# Patient Record
Sex: FEMALE | Race: BLACK OR AFRICAN AMERICAN | NOT HISPANIC OR LATINO | Employment: FULL TIME | RURAL
[De-identification: names, ages, dates, MRNs, and addresses within clinical notes are randomized per-mention and may not be internally consistent; named-entity substitution may affect disease eponyms.]

---

## 2021-10-08 ENCOUNTER — HOSPITAL ENCOUNTER (EMERGENCY)
Facility: HOSPITAL | Age: 41
Discharge: HOME | End: 2021-10-08
Attending: FAMILY MEDICINE
Payer: OTHER GOVERNMENT

## 2021-10-08 VITALS
OXYGEN SATURATION: 100 % | DIASTOLIC BLOOD PRESSURE: 87 MMHG | HEIGHT: 63 IN | RESPIRATION RATE: 14 BRPM | SYSTOLIC BLOOD PRESSURE: 146 MMHG | WEIGHT: 138 LBS | BODY MASS INDEX: 24.45 KG/M2 | TEMPERATURE: 99 F | HEART RATE: 85 BPM

## 2021-10-08 DIAGNOSIS — J45.909 ACUTE ASTHMATIC BRONCHITIS: Primary | ICD-10-CM

## 2021-10-08 DIAGNOSIS — R07.9 CHEST PAIN: ICD-10-CM

## 2021-10-08 LAB
FLUAV AG UPPER RESP QL IA.RAPID: NEGATIVE
FLUBV AG UPPER RESP QL IA.RAPID: NEGATIVE
RAPID GROUP A STREP: NEGATIVE
SARS-COV+SARS-COV-2 AG RESP QL IA.RAPID: NEGATIVE

## 2021-10-08 PROCEDURE — 96372 THER/PROPH/DIAG INJ SC/IM: CPT

## 2021-10-08 PROCEDURE — 99283 PR EMERGENCY DEPT VISIT,LEVEL III: ICD-10-PCS | Mod: ,,, | Performed by: FAMILY MEDICINE

## 2021-10-08 PROCEDURE — 99283 EMERGENCY DEPT VISIT LOW MDM: CPT | Mod: ,,, | Performed by: FAMILY MEDICINE

## 2021-10-08 PROCEDURE — 87880 STREP A ASSAY W/OPTIC: CPT | Performed by: FAMILY MEDICINE

## 2021-10-08 PROCEDURE — 87428 SARSCOV & INF VIR A&B AG IA: CPT | Performed by: FAMILY MEDICINE

## 2021-10-08 PROCEDURE — 93005 ELECTROCARDIOGRAM TRACING: CPT

## 2021-10-08 PROCEDURE — 99285 EMERGENCY DEPT VISIT HI MDM: CPT | Mod: 25

## 2021-10-08 PROCEDURE — 25000242 PHARM REV CODE 250 ALT 637 W/ HCPCS: Mod: GY | Performed by: FAMILY MEDICINE

## 2021-10-08 PROCEDURE — 93010 EKG 12-LEAD: ICD-10-PCS | Mod: ,,, | Performed by: INTERNAL MEDICINE

## 2021-10-08 PROCEDURE — 93010 ELECTROCARDIOGRAM REPORT: CPT | Mod: ,,, | Performed by: INTERNAL MEDICINE

## 2021-10-08 PROCEDURE — 63600175 PHARM REV CODE 636 W HCPCS: Performed by: FAMILY MEDICINE

## 2021-10-08 RX ORDER — IPRATROPIUM BROMIDE AND ALBUTEROL SULFATE 2.5; .5 MG/3ML; MG/3ML
3 SOLUTION RESPIRATORY (INHALATION)
Status: COMPLETED | OUTPATIENT
Start: 2021-10-08 | End: 2021-10-08

## 2021-10-08 RX ORDER — PREDNISONE 20 MG/1
40 TABLET ORAL DAILY
Qty: 8 TABLET | Refills: 0 | Status: SHIPPED | OUTPATIENT
Start: 2021-10-08 | End: 2021-10-12

## 2021-10-08 RX ORDER — DEXAMETHASONE SODIUM PHOSPHATE 4 MG/ML
8 INJECTION, SOLUTION INTRA-ARTICULAR; INTRALESIONAL; INTRAMUSCULAR; INTRAVENOUS; SOFT TISSUE
Status: COMPLETED | OUTPATIENT
Start: 2021-10-08 | End: 2021-10-08

## 2021-10-08 RX ORDER — ALBUTEROL SULFATE 90 UG/1
2 AEROSOL, METERED RESPIRATORY (INHALATION) EVERY 6 HOURS PRN
Qty: 18 G | Refills: 0 | Status: SHIPPED | OUTPATIENT
Start: 2021-10-08 | End: 2022-10-08

## 2021-10-08 RX ADMIN — IPRATROPIUM BROMIDE AND ALBUTEROL SULFATE 3 ML: .5; 3 SOLUTION RESPIRATORY (INHALATION) at 12:10

## 2021-10-08 RX ADMIN — DEXAMETHASONE SODIUM PHOSPHATE 8 MG: 4 INJECTION, SOLUTION INTRAMUSCULAR; INTRAVENOUS at 12:10

## 2021-12-28 ENCOUNTER — HOSPITAL ENCOUNTER (EMERGENCY)
Facility: HOSPITAL | Age: 41
Discharge: HOME | End: 2021-12-28
Attending: FAMILY MEDICINE
Payer: OTHER GOVERNMENT

## 2021-12-28 VITALS
HEART RATE: 99 BPM | HEIGHT: 63 IN | OXYGEN SATURATION: 94 % | BODY MASS INDEX: 24.8 KG/M2 | SYSTOLIC BLOOD PRESSURE: 130 MMHG | WEIGHT: 140 LBS | RESPIRATION RATE: 28 BRPM | DIASTOLIC BLOOD PRESSURE: 90 MMHG | TEMPERATURE: 98 F

## 2021-12-28 DIAGNOSIS — R06.02 SOB (SHORTNESS OF BREATH): ICD-10-CM

## 2021-12-28 DIAGNOSIS — J44.1 COPD WITH ACUTE EXACERBATION: Primary | ICD-10-CM

## 2021-12-28 LAB
ALBUMIN SERPL BCP-MCNC: 3.2 G/DL (ref 3.5–5)
ALBUMIN/GLOB SERPL: 0.7 {RATIO}
ALP SERPL-CCNC: 100 U/L (ref 37–98)
ALT SERPL W P-5'-P-CCNC: 16 U/L (ref 13–56)
ANION GAP SERPL CALCULATED.3IONS-SCNC: 15 MMOL/L (ref 7–16)
AST SERPL W P-5'-P-CCNC: 10 U/L (ref 15–37)
BASOPHILS # BLD AUTO: 0.02 K/UL (ref 0–0.2)
BASOPHILS NFR BLD AUTO: 0.2 % (ref 0–1)
BILIRUB SERPL-MCNC: 0.5 MG/DL (ref 0–1.2)
BUN SERPL-MCNC: 4 MG/DL (ref 7–18)
BUN/CREAT SERPL: 7 (ref 6–20)
CALCIUM SERPL-MCNC: 9.1 MG/DL (ref 8.5–10.1)
CHLORIDE SERPL-SCNC: 104 MMOL/L (ref 98–107)
CO2 SERPL-SCNC: 24 MMOL/L (ref 21–32)
CREAT SERPL-MCNC: 0.57 MG/DL (ref 0.55–1.02)
DIFFERENTIAL METHOD BLD: ABNORMAL
EOSINOPHIL # BLD AUTO: 0.03 K/UL (ref 0–0.5)
EOSINOPHIL NFR BLD AUTO: 0.2 % (ref 1–4)
ERYTHROCYTE [DISTWIDTH] IN BLOOD BY AUTOMATED COUNT: 15 % (ref 11.5–14.5)
FLUAV AG UPPER RESP QL IA.RAPID: NEGATIVE
FLUBV AG UPPER RESP QL IA.RAPID: NEGATIVE
GLOBULIN SER-MCNC: 4.6 G/DL (ref 2–4)
GLUCOSE SERPL-MCNC: 108 MG/DL (ref 74–106)
HCT VFR BLD AUTO: 42.1 % (ref 38–47)
HGB BLD-MCNC: 14.2 G/DL (ref 12–16)
IMM GRANULOCYTES # BLD AUTO: 0.06 K/UL (ref 0–0.04)
IMM GRANULOCYTES NFR BLD: 0.5 % (ref 0–0.4)
LYMPHOCYTES # BLD AUTO: 1.7 K/UL (ref 1–4.8)
LYMPHOCYTES NFR BLD AUTO: 14.1 % (ref 27–41)
MCH RBC QN AUTO: 29.2 PG (ref 27–31)
MCHC RBC AUTO-ENTMCNC: 33.7 G/DL (ref 32–36)
MCV RBC AUTO: 86.6 FL (ref 80–96)
MONOCYTES # BLD AUTO: 1.12 K/UL (ref 0–0.8)
MONOCYTES NFR BLD AUTO: 9.3 % (ref 2–6)
MPC BLD CALC-MCNC: 11.5 FL (ref 9.4–12.4)
NEUTROPHILS # BLD AUTO: 9.1 K/UL (ref 1.8–7.7)
NEUTROPHILS NFR BLD AUTO: 75.7 % (ref 53–65)
NRBC # BLD AUTO: 0 X10E3/UL
NRBC, AUTO (.00): 0 %
PLATELET # BLD AUTO: 248 K/UL (ref 150–400)
POTASSIUM SERPL-SCNC: 3.8 MMOL/L (ref 3.5–5.1)
PROT SERPL-MCNC: 7.8 G/DL (ref 6.4–8.2)
RBC # BLD AUTO: 4.86 M/UL (ref 4.2–5.4)
SARS-COV+SARS-COV-2 AG RESP QL IA.RAPID: NEGATIVE
SODIUM SERPL-SCNC: 139 MMOL/L (ref 136–145)
WBC # BLD AUTO: 12.03 K/UL (ref 4.5–11)

## 2021-12-28 PROCEDURE — 87428 SARSCOV & INF VIR A&B AG IA: CPT | Performed by: FAMILY MEDICINE

## 2021-12-28 PROCEDURE — 93005 ELECTROCARDIOGRAM TRACING: CPT

## 2021-12-28 PROCEDURE — 36415 COLL VENOUS BLD VENIPUNCTURE: CPT | Performed by: FAMILY MEDICINE

## 2021-12-28 PROCEDURE — 25000242 PHARM REV CODE 250 ALT 637 W/ HCPCS: Performed by: FAMILY MEDICINE

## 2021-12-28 PROCEDURE — 99284 PR EMERGENCY DEPT VISIT,LEVEL IV: ICD-10-PCS | Mod: ,,, | Performed by: FAMILY MEDICINE

## 2021-12-28 PROCEDURE — 93010 ELECTROCARDIOGRAM REPORT: CPT | Mod: ,,, | Performed by: INTERNAL MEDICINE

## 2021-12-28 PROCEDURE — 80053 COMPREHEN METABOLIC PANEL: CPT | Performed by: FAMILY MEDICINE

## 2021-12-28 PROCEDURE — 25000003 PHARM REV CODE 250: Performed by: FAMILY MEDICINE

## 2021-12-28 PROCEDURE — 96365 THER/PROPH/DIAG IV INF INIT: CPT

## 2021-12-28 PROCEDURE — 93010 EKG 12-LEAD: ICD-10-PCS | Mod: ,,, | Performed by: INTERNAL MEDICINE

## 2021-12-28 PROCEDURE — 85025 COMPLETE CBC W/AUTO DIFF WBC: CPT | Performed by: FAMILY MEDICINE

## 2021-12-28 PROCEDURE — 99285 EMERGENCY DEPT VISIT HI MDM: CPT | Mod: 25

## 2021-12-28 PROCEDURE — 63600175 PHARM REV CODE 636 W HCPCS: Performed by: FAMILY MEDICINE

## 2021-12-28 PROCEDURE — 96375 TX/PRO/DX INJ NEW DRUG ADDON: CPT

## 2021-12-28 PROCEDURE — 99284 EMERGENCY DEPT VISIT MOD MDM: CPT | Mod: ,,, | Performed by: FAMILY MEDICINE

## 2021-12-28 RX ORDER — AZITHROMYCIN 250 MG/1
250 TABLET, FILM COATED ORAL DAILY
Qty: 6 TABLET | Refills: 0 | Status: SHIPPED | OUTPATIENT
Start: 2021-12-28

## 2021-12-28 RX ORDER — ALBUTEROL SULFATE 90 UG/1
4 AEROSOL, METERED RESPIRATORY (INHALATION) EVERY 8 HOURS
Status: DISCONTINUED | OUTPATIENT
Start: 2021-12-28 | End: 2021-12-28

## 2021-12-28 RX ORDER — ONDANSETRON 2 MG/ML
4 INJECTION INTRAMUSCULAR; INTRAVENOUS
Status: COMPLETED | OUTPATIENT
Start: 2021-12-28 | End: 2021-12-28

## 2021-12-28 RX ORDER — IPRATROPIUM BROMIDE AND ALBUTEROL SULFATE 2.5; .5 MG/3ML; MG/3ML
6 SOLUTION RESPIRATORY (INHALATION)
Status: COMPLETED | OUTPATIENT
Start: 2021-12-28 | End: 2021-12-28

## 2021-12-28 RX ORDER — ALBUTEROL SULFATE 2.5 MG/.5ML
10 SOLUTION RESPIRATORY (INHALATION) ONCE
Status: COMPLETED | OUTPATIENT
Start: 2021-12-28 | End: 2021-12-28

## 2021-12-28 RX ORDER — METHYLPREDNISOLONE SOD SUCC 125 MG
125 VIAL (EA) INJECTION
Status: COMPLETED | OUTPATIENT
Start: 2021-12-28 | End: 2021-12-28

## 2021-12-28 RX ADMIN — METHYLPREDNISOLONE SODIUM SUCCINATE 125 MG: 125 INJECTION, POWDER, FOR SOLUTION INTRAMUSCULAR; INTRAVENOUS at 11:12

## 2021-12-28 RX ADMIN — ONDANSETRON 4 MG: 2 INJECTION INTRAMUSCULAR; INTRAVENOUS at 11:12

## 2021-12-28 RX ADMIN — ALBUTEROL SULFATE 4 PUFF: 90 AEROSOL, METERED RESPIRATORY (INHALATION) at 10:12

## 2021-12-28 RX ADMIN — CEFTRIAXONE SODIUM 1 G: 1 INJECTION, POWDER, FOR SOLUTION INTRAMUSCULAR; INTRAVENOUS at 11:12

## 2021-12-28 RX ADMIN — ALBUTEROL SULFATE 10 MG: 2.5 SOLUTION RESPIRATORY (INHALATION) at 02:12

## 2021-12-28 RX ADMIN — IPRATROPIUM BROMIDE AND ALBUTEROL SULFATE 6 ML: .5; 3 SOLUTION RESPIRATORY (INHALATION) at 11:12

## 2022-03-06 ENCOUNTER — HOSPITAL ENCOUNTER (EMERGENCY)
Facility: HOSPITAL | Age: 42
Discharge: HOME | End: 2022-03-06

## 2022-03-06 VITALS
HEIGHT: 60 IN | TEMPERATURE: 98 F | SYSTOLIC BLOOD PRESSURE: 130 MMHG | OXYGEN SATURATION: 100 % | BODY MASS INDEX: 31.41 KG/M2 | WEIGHT: 160 LBS | DIASTOLIC BLOOD PRESSURE: 77 MMHG | HEART RATE: 92 BPM | RESPIRATION RATE: 16 BRPM

## 2022-03-06 DIAGNOSIS — J45.909 ASTHMA, UNSPECIFIED ASTHMA SEVERITY, UNSPECIFIED WHETHER COMPLICATED, UNSPECIFIED WHETHER PERSISTENT: Primary | ICD-10-CM

## 2022-03-06 PROCEDURE — 99283 EMERGENCY DEPT VISIT LOW MDM: CPT | Mod: ,,, | Performed by: NURSE PRACTITIONER

## 2022-03-06 PROCEDURE — 25000242 PHARM REV CODE 250 ALT 637 W/ HCPCS: Performed by: NURSE PRACTITIONER

## 2022-03-06 PROCEDURE — 99283 EMERGENCY DEPT VISIT LOW MDM: CPT

## 2022-03-06 PROCEDURE — 99283 PR EMERGENCY DEPT VISIT,LEVEL III: ICD-10-PCS | Mod: ,,, | Performed by: NURSE PRACTITIONER

## 2022-03-06 RX ORDER — IPRATROPIUM BROMIDE AND ALBUTEROL SULFATE 2.5; .5 MG/3ML; MG/3ML
3 SOLUTION RESPIRATORY (INHALATION)
Status: COMPLETED | OUTPATIENT
Start: 2022-03-06 | End: 2022-03-06

## 2022-03-06 RX ORDER — ALBUTEROL SULFATE 90 UG/1
2 AEROSOL, METERED RESPIRATORY (INHALATION) EVERY 4 HOURS PRN
Qty: 8 G | Refills: 0 | Status: SHIPPED | OUTPATIENT
Start: 2022-03-06 | End: 2023-10-12 | Stop reason: SDUPTHER

## 2022-03-06 RX ORDER — ALBUTEROL SULFATE 0.63 MG/3ML
0.63 SOLUTION RESPIRATORY (INHALATION) EVERY 6 HOURS PRN
Qty: 75 ML | Refills: 0 | Status: SHIPPED | OUTPATIENT
Start: 2022-03-06 | End: 2023-03-06

## 2022-03-06 RX ADMIN — IPRATROPIUM BROMIDE AND ALBUTEROL SULFATE 3 ML: 2.5; .5 SOLUTION RESPIRATORY (INHALATION) at 11:03

## 2022-03-06 NOTE — ED PROVIDER NOTES
Encounter Date: 3/6/2022       History     Chief Complaint   Patient presents with    Asthma    Shortness of Breath     41 year old female presents to the emergency department to be evaluated for shortness of breath and wheezing. She has a history of asthma and is a current smoker. She usually uses her albuterol inhaler for shortness of breath and wheezing as needed, but ran out yesterday. Denies any cough, fever, runny nose, chest pain, nausea, vomiting, diarrhea, loss of sense of taste or smell, known sick contacts. She requests a refill on her albuterol.    The history is provided by the patient.   Asthma  This is a chronic problem. Associated symptoms include shortness of breath. Pertinent negatives include no chest pain, no abdominal pain and no headaches.   Shortness of Breath  This is a recurrent problem. Pertinent negatives include no fever, no headaches, no coryza, no rhinorrhea, no sore throat, no swollen glands, no ear pain, no neck pain, no cough, no sputum production, no hemoptysis, no wheezing, no PND, no orthopnea, no chest pain, no syncope, no vomiting, no abdominal pain, no rash, no leg pain, no leg swelling and no claudication. Associated medical issues include asthma.     Review of patient's allergies indicates:  No Known Allergies  Past Medical History:   Diagnosis Date    Asthma     COPD (chronic obstructive pulmonary disease)     Emphysema of lung      History reviewed. No pertinent surgical history.  History reviewed. No pertinent family history.  Social History     Tobacco Use    Smoking status: Current Every Day Smoker     Packs/day: 0.50     Types: Cigarettes    Smokeless tobacco: Never Used   Substance Use Topics    Alcohol use: Not Currently    Drug use: Never     Review of Systems   Constitutional: Negative for fever.   HENT: Negative for ear pain, rhinorrhea and sore throat.    Respiratory: Positive for shortness of breath. Negative for cough, hemoptysis, sputum  production and wheezing.    Cardiovascular: Negative for chest pain, orthopnea, claudication, leg swelling, syncope and PND.   Gastrointestinal: Negative for abdominal pain and vomiting.   Musculoskeletal: Negative for neck pain.   Skin: Negative for rash.   Neurological: Negative for headaches.   All other systems reviewed and are negative.      Physical Exam     Initial Vitals [03/06/22 1131]   BP Pulse Resp Temp SpO2   130/77 92 16 98.1 °F (36.7 °C) 100 %      MAP       --         Physical Exam    Vitals reviewed.  Constitutional: She appears well-developed and well-nourished.   Neck: Neck supple.   Cardiovascular: Normal rate and regular rhythm.   Pulmonary/Chest: Breath sounds normal.   Abdominal: Abdomen is soft. Bowel sounds are normal. She exhibits no distension and no mass. There is no abdominal tenderness. There is no rebound and no guarding.   Musculoskeletal:         General: No tenderness or edema. Normal range of motion.      Cervical back: Neck supple.     Neurological: She is alert and oriented to person, place, and time. She has normal strength. GCS score is 15. GCS eye subscore is 4. GCS verbal subscore is 5. GCS motor subscore is 6.   Skin: Skin is warm and dry. Capillary refill takes less than 2 seconds.   Psychiatric: She has a normal mood and affect.         Medical Screening Exam   See Full Note    ED Course   Procedures  Labs Reviewed - No data to display       Imaging Results    None          Medications   albuterol-ipratropium 2.5 mg-0.5 mg/3 mL nebulizer solution 3 mL (3 mLs Nebulization Given 3/6/22 1142)                       Clinical Impression:   Final diagnoses:  [J45.909] Asthma, unspecified asthma severity, unspecified whether complicated, unspecified whether persistent (Primary)          ED Disposition Condition    Discharge Stable        ED Prescriptions     Medication Sig Dispense Start Date End Date Auth. Provider    albuterol (PROVENTIL/VENTOLIN HFA) 90 mcg/actuation inhaler  Inhale 2 puffs into the lungs every 4 (four) hours as needed for Wheezing. Rescue 8 g 3/6/2022  NEGAR Doe    albuterol (ACCUNEB) 0.63 mg/3 mL Nebu Take 3 mLs (0.63 mg total) by nebulization every 6 (six) hours as needed. Rescue 75 mL 3/6/2022 3/6/2023 NEGAR Doe        Follow-up Information    None          NEGAR Doe  03/06/22 1209       NEGAR Doe  03/06/22 1210

## 2022-03-06 NOTE — PROVIDER PROGRESS NOTES - EMERGENCY DEPT.
Encounter Date: 3/6/2022    ED Physician Progress Notes        Wheezing resolved. Patient said she feels much better and wants to be discharged

## 2022-03-06 NOTE — ED TRIAGE NOTES
Patient states she ran out of her asthma medicine yesterday and that she had very little when she used her inhaler.  C/o sob, wheezing noted.

## 2022-03-06 NOTE — Clinical Note
"Kari"Juice Poon was seen and treated in our emergency department on 3/6/2022.  She may return to work on 03/07/2022.       If you have any questions or concerns, please don't hesitate to call.      NEGAR Doe"

## 2022-04-23 ENCOUNTER — HOSPITAL ENCOUNTER (EMERGENCY)
Facility: HOSPITAL | Age: 42
Discharge: HOME | End: 2022-04-23

## 2022-04-23 VITALS
HEART RATE: 105 BPM | HEIGHT: 60 IN | TEMPERATURE: 98 F | RESPIRATION RATE: 16 BRPM | DIASTOLIC BLOOD PRESSURE: 98 MMHG | OXYGEN SATURATION: 98 % | SYSTOLIC BLOOD PRESSURE: 145 MMHG | BODY MASS INDEX: 31.25 KG/M2

## 2022-04-23 DIAGNOSIS — N93.9 VAGINAL BLEEDING: Primary | ICD-10-CM

## 2022-04-23 LAB
ALBUMIN SERPL BCP-MCNC: 4 G/DL (ref 3.5–5)
ALBUMIN/GLOB SERPL: 1.1 {RATIO}
ALP SERPL-CCNC: 84 U/L (ref 37–98)
ALT SERPL W P-5'-P-CCNC: 18 U/L (ref 13–56)
ANION GAP SERPL CALCULATED.3IONS-SCNC: 16 MMOL/L (ref 7–16)
AST SERPL W P-5'-P-CCNC: 13 U/L (ref 15–37)
B-HCG UR QL: NEGATIVE
BACTERIA #/AREA URNS HPF: ABNORMAL /HPF
BASOPHILS # BLD AUTO: 0.04 K/UL (ref 0–0.2)
BASOPHILS NFR BLD AUTO: 0.4 % (ref 0–1)
BILIRUB SERPL-MCNC: 0.2 MG/DL (ref 0–1.2)
BILIRUB UR QL STRIP: NEGATIVE
BUN SERPL-MCNC: 11 MG/DL (ref 7–18)
BUN/CREAT SERPL: 17 (ref 6–20)
CALCIUM SERPL-MCNC: 8.5 MG/DL (ref 8.5–10.1)
CHLORIDE SERPL-SCNC: 103 MMOL/L (ref 98–107)
CLARITY UR: ABNORMAL
CO2 SERPL-SCNC: 23 MMOL/L (ref 21–32)
COLOR UR: ABNORMAL
CREAT SERPL-MCNC: 0.66 MG/DL (ref 0.55–1.02)
CTP QC/QA: YES
DIFFERENTIAL METHOD BLD: ABNORMAL
EOSINOPHIL # BLD AUTO: 0.41 K/UL (ref 0–0.5)
EOSINOPHIL NFR BLD AUTO: 4.5 % (ref 1–4)
ERYTHROCYTE [DISTWIDTH] IN BLOOD BY AUTOMATED COUNT: 13.6 % (ref 11.5–14.5)
GLOBULIN SER-MCNC: 3.5 G/DL (ref 2–4)
GLUCOSE SERPL-MCNC: 89 MG/DL (ref 74–106)
GLUCOSE UR STRIP-MCNC: NEGATIVE MG/DL
HCT VFR BLD AUTO: 39.3 % (ref 38–47)
HGB BLD-MCNC: 13.1 G/DL (ref 12–16)
IMM GRANULOCYTES # BLD AUTO: 0.03 K/UL (ref 0–0.04)
IMM GRANULOCYTES NFR BLD: 0.3 % (ref 0–0.4)
KETONES UR STRIP-SCNC: NEGATIVE MG/DL
LEUKOCYTE ESTERASE UR QL STRIP: ABNORMAL
LYMPHOCYTES # BLD AUTO: 1.93 K/UL (ref 1–4.8)
LYMPHOCYTES NFR BLD AUTO: 21.3 % (ref 27–41)
MCH RBC QN AUTO: 30.5 PG (ref 27–31)
MCHC RBC AUTO-ENTMCNC: 33.3 G/DL (ref 32–36)
MCV RBC AUTO: 91.6 FL (ref 80–96)
MONOCYTES # BLD AUTO: 0.58 K/UL (ref 0–0.8)
MONOCYTES NFR BLD AUTO: 6.4 % (ref 2–6)
MPC BLD CALC-MCNC: 10.5 FL (ref 9.4–12.4)
MUCOUS THREADS #/AREA URNS HPF: ABNORMAL /HPF
NEUTROPHILS # BLD AUTO: 6.06 K/UL (ref 1.8–7.7)
NEUTROPHILS NFR BLD AUTO: 67.1 % (ref 53–65)
NITRITE UR QL STRIP: NEGATIVE
NRBC # BLD AUTO: 0 X10E3/UL
NRBC, AUTO (.00): 0 %
PH UR STRIP: 5 PH UNITS
PLATELET # BLD AUTO: 287 K/UL (ref 150–400)
POTASSIUM SERPL-SCNC: 3.7 MMOL/L (ref 3.5–5.1)
PROT SERPL-MCNC: 7.5 G/DL (ref 6.4–8.2)
PROT UR QL STRIP: ABNORMAL
RBC # BLD AUTO: 4.29 M/UL (ref 4.2–5.4)
RBC # UR STRIP: ABNORMAL /UL
RBC #/AREA URNS HPF: ABNORMAL /HPF
SODIUM SERPL-SCNC: 138 MMOL/L (ref 136–145)
SP GR UR STRIP: 1.02
TRICHOMONAS #/AREA URNS HPF: ABNORMAL /HPF
UROBILINOGEN UR STRIP-ACNC: 0.2 MG/DL
WBC # BLD AUTO: 9.05 K/UL (ref 4.5–11)
WBC #/AREA URNS HPF: ABNORMAL /HPF
YEAST #/AREA URNS HPF: ABNORMAL /HPF

## 2022-04-23 PROCEDURE — 85025 COMPLETE CBC W/AUTO DIFF WBC: CPT | Performed by: NURSE PRACTITIONER

## 2022-04-23 PROCEDURE — 36415 COLL VENOUS BLD VENIPUNCTURE: CPT | Performed by: NURSE PRACTITIONER

## 2022-04-23 PROCEDURE — 99283 PR EMERGENCY DEPT VISIT,LEVEL III: ICD-10-PCS | Mod: ,,, | Performed by: NURSE PRACTITIONER

## 2022-04-23 PROCEDURE — 80053 COMPREHEN METABOLIC PANEL: CPT | Performed by: NURSE PRACTITIONER

## 2022-04-23 PROCEDURE — 81025 URINE PREGNANCY TEST: CPT | Performed by: NURSE PRACTITIONER

## 2022-04-23 PROCEDURE — 81001 URINALYSIS AUTO W/SCOPE: CPT | Performed by: NURSE PRACTITIONER

## 2022-04-23 PROCEDURE — 99283 EMERGENCY DEPT VISIT LOW MDM: CPT | Mod: ,,, | Performed by: NURSE PRACTITIONER

## 2022-04-23 PROCEDURE — 99283 EMERGENCY DEPT VISIT LOW MDM: CPT

## 2022-04-23 NOTE — DISCHARGE INSTRUCTIONS
Call OBGYN on the morning of 04/25/2022.  Return emergency room for increased weakness, dizziness, shortness of breath and as needed.

## 2022-04-23 NOTE — ED PROVIDER NOTES
Encounter Date: 4/23/2022       History     Chief Complaint   Patient presents with    Vaginal Bleeding     41-year-old female presents with complaint of heavy vaginal bleeding that began on yesterday.  States that she has been having to wear 3 pads at a time.  States that when she woke this morning her bed including were messed up vaginal bleeding.  States she had her last normal menstrual period on the 10th of this month.  She does not feel she is pregnant however she is sexually active and does not use protection.  States she is also having heavy cramping with the vaginal bleeding.        Review of patient's allergies indicates:  No Known Allergies  Past Medical History:   Diagnosis Date    Asthma     COPD (chronic obstructive pulmonary disease)     Emphysema of lung      History reviewed. No pertinent surgical history.  History reviewed. No pertinent family history.  Social History     Tobacco Use    Smoking status: Current Every Day Smoker     Packs/day: 0.50     Types: Cigarettes    Smokeless tobacco: Never Used   Substance Use Topics    Alcohol use: Not Currently    Drug use: Never     Review of Systems   Constitutional: Negative for activity change, appetite change, fatigue and fever.   Gastrointestinal: Negative.  Negative for abdominal distention, abdominal pain, anal bleeding, blood in stool, constipation, diarrhea, nausea, rectal pain and vomiting.        Abdominal cramping which she describes as same as when she has her menstrual cycle only harder.   Genitourinary: Positive for menstrual problem and vaginal bleeding. Negative for decreased urine volume, difficulty urinating, dysuria, flank pain, frequency, pelvic pain, urgency, vaginal discharge and vaginal pain.   Musculoskeletal: Negative for back pain and gait problem.   Neurological: Negative for dizziness, syncope, weakness and light-headedness.       Physical Exam     Initial Vitals [04/23/22 0733]   BP Pulse Resp Temp SpO2   (!) 145/98  105 16 97.9 °F (36.6 °C) 98 %      MAP       --         Physical Exam    Constitutional: She appears well-developed and well-nourished.   HENT:   Head: Normocephalic and atraumatic.   Eyes: Conjunctivae are normal. Pupils are equal, round, and reactive to light.   Neck: Neck supple. No JVD present.   Cardiovascular: Regular rhythm, normal heart sounds and intact distal pulses.   Pulmonary/Chest: Breath sounds normal.   Abdominal: Abdomen is soft. Bowel sounds are normal. She exhibits no distension. There is no abdominal tenderness. There is no guarding.   Musculoskeletal:      Cervical back: Neck supple.     Neurological: She is alert and oriented to person, place, and time. GCS score is 15. GCS eye subscore is 4. GCS verbal subscore is 5. GCS motor subscore is 6.   Skin: Skin is warm and dry. Capillary refill takes less than 2 seconds.         Medical Screening Exam   See Full Note    ED Course   Procedures  Labs Reviewed   COMPREHENSIVE METABOLIC PANEL - Abnormal; Notable for the following components:       Result Value    AST 13 (*)     All other components within normal limits   URINALYSIS, REFLEX TO URINE CULTURE - Abnormal; Notable for the following components:    Color, UA Red (*)     Clarity, UA Slightly Cloudy (*)     Leukocytes, UA Trace (*)     Protein, UA Trace (*)     Blood, UA Large (*)     All other components within normal limits   CBC WITH DIFFERENTIAL - Abnormal; Notable for the following components:    Neutrophils % 67.1 (*)     Lymphocytes % 21.3 (*)     Monocytes % 6.4 (*)     Eosinophils % 4.5 (*)     All other components within normal limits   URINALYSIS, MICROSCOPIC - Abnormal; Notable for the following components:    RBC, UA Too Numerous To Count (*)     All other components within normal limits   CBC W/ AUTO DIFFERENTIAL    Narrative:     The following orders were created for panel order CBC auto differential.  Procedure                               Abnormality         Status                      ---------                               -----------         ------                     CBC with Differential[[id number]]        Abnormal            Final result                 Please view results for these tests on the individual orders.   POCT URINE PREGNANCY          Imaging Results    None          Medications - No data to display                    Clinical Impression:   Final diagnoses:  [N93.9] Vaginal bleeding (Primary)          ED Disposition Condition    Discharge Stable        ED Prescriptions     None        Follow-up Information    None          NEGAR Bailey  04/23/22 0941

## 2022-04-23 NOTE — ED TRIAGE NOTES
Patient presents to ed with c/o vaginal bleeding that she states is heavier than her regular menstrual cycle.

## 2022-04-23 NOTE — Clinical Note
"Kari Mackenzielizzie Poon was seen and treated in our emergency department on 4/23/2022.  She may return to work on 04/25/2022.       If you have any questions or concerns, please don't hesitate to call.       RN    "

## 2022-05-07 ENCOUNTER — HOSPITAL ENCOUNTER (EMERGENCY)
Facility: HOSPITAL | Age: 42
Discharge: HOME | End: 2022-05-07

## 2022-05-07 VITALS
BODY MASS INDEX: 28.88 KG/M2 | TEMPERATURE: 98 F | HEART RATE: 102 BPM | HEIGHT: 63 IN | SYSTOLIC BLOOD PRESSURE: 125 MMHG | DIASTOLIC BLOOD PRESSURE: 80 MMHG | WEIGHT: 163 LBS | RESPIRATION RATE: 19 BRPM | OXYGEN SATURATION: 98 %

## 2022-05-07 DIAGNOSIS — J45.901 EXACERBATION OF ASTHMA, UNSPECIFIED ASTHMA SEVERITY, UNSPECIFIED WHETHER PERSISTENT: Primary | ICD-10-CM

## 2022-05-07 DIAGNOSIS — R06.02 SOB (SHORTNESS OF BREATH): ICD-10-CM

## 2022-05-07 LAB
ANION GAP SERPL CALCULATED.3IONS-SCNC: 13 MMOL/L (ref 7–16)
B-HCG UR QL: NEGATIVE
BASOPHILS # BLD AUTO: 0.05 K/UL (ref 0–0.2)
BASOPHILS NFR BLD AUTO: 0.6 % (ref 0–1)
BUN SERPL-MCNC: 12 MG/DL (ref 7–18)
BUN/CREAT SERPL: 17 (ref 6–20)
CALCIUM SERPL-MCNC: 9.6 MG/DL (ref 8.5–10.1)
CHLORIDE SERPL-SCNC: 105 MMOL/L (ref 98–107)
CO2 SERPL-SCNC: 26 MMOL/L (ref 21–32)
CREAT SERPL-MCNC: 0.69 MG/DL (ref 0.55–1.02)
CTP QC/QA: YES
DIFFERENTIAL METHOD BLD: ABNORMAL
EOSINOPHIL # BLD AUTO: 0.79 K/UL (ref 0–0.5)
EOSINOPHIL NFR BLD AUTO: 9.1 % (ref 1–4)
ERYTHROCYTE [DISTWIDTH] IN BLOOD BY AUTOMATED COUNT: 13.5 % (ref 11.5–14.5)
GLUCOSE SERPL-MCNC: 100 MG/DL (ref 74–106)
HCT VFR BLD AUTO: 41.9 % (ref 38–47)
HGB BLD-MCNC: 13.8 G/DL (ref 12–16)
IMM GRANULOCYTES # BLD AUTO: 0.01 K/UL (ref 0–0.04)
IMM GRANULOCYTES NFR BLD: 0.1 % (ref 0–0.4)
LYMPHOCYTES # BLD AUTO: 3.39 K/UL (ref 1–4.8)
LYMPHOCYTES NFR BLD AUTO: 38.9 % (ref 27–41)
MCH RBC QN AUTO: 30.2 PG (ref 27–31)
MCHC RBC AUTO-ENTMCNC: 32.9 G/DL (ref 32–36)
MCV RBC AUTO: 91.7 FL (ref 80–96)
MONOCYTES # BLD AUTO: 0.55 K/UL (ref 0–0.8)
MONOCYTES NFR BLD AUTO: 6.3 % (ref 2–6)
MPC BLD CALC-MCNC: 10.8 FL (ref 9.4–12.4)
NEUTROPHILS # BLD AUTO: 3.92 K/UL (ref 1.8–7.7)
NEUTROPHILS NFR BLD AUTO: 45 % (ref 53–65)
NRBC # BLD AUTO: 0 X10E3/UL
NRBC, AUTO (.00): 0 %
PLATELET # BLD AUTO: 346 K/UL (ref 150–400)
POTASSIUM SERPL-SCNC: 4.3 MMOL/L (ref 3.5–5.1)
RBC # BLD AUTO: 4.57 M/UL (ref 4.2–5.4)
SODIUM SERPL-SCNC: 140 MMOL/L (ref 136–145)
WBC # BLD AUTO: 8.71 K/UL (ref 4.5–11)

## 2022-05-07 PROCEDURE — 99285 EMERGENCY DEPT VISIT HI MDM: CPT | Mod: 25

## 2022-05-07 PROCEDURE — 25000242 PHARM REV CODE 250 ALT 637 W/ HCPCS: Performed by: NURSE PRACTITIONER

## 2022-05-07 PROCEDURE — 93005 ELECTROCARDIOGRAM TRACING: CPT

## 2022-05-07 PROCEDURE — 85025 COMPLETE CBC W/AUTO DIFF WBC: CPT | Performed by: NURSE PRACTITIONER

## 2022-05-07 PROCEDURE — 93010 EKG 12-LEAD: ICD-10-PCS | Mod: ,,, | Performed by: STUDENT IN AN ORGANIZED HEALTH CARE EDUCATION/TRAINING PROGRAM

## 2022-05-07 PROCEDURE — 82310 ASSAY OF CALCIUM: CPT | Performed by: NURSE PRACTITIONER

## 2022-05-07 PROCEDURE — 99284 PR EMERGENCY DEPT VISIT,LEVEL IV: ICD-10-PCS | Mod: ,,, | Performed by: NURSE PRACTITIONER

## 2022-05-07 PROCEDURE — 36415 COLL VENOUS BLD VENIPUNCTURE: CPT | Performed by: NURSE PRACTITIONER

## 2022-05-07 PROCEDURE — 80048 BASIC METABOLIC PNL TOTAL CA: CPT | Performed by: NURSE PRACTITIONER

## 2022-05-07 PROCEDURE — 93010 ELECTROCARDIOGRAM REPORT: CPT | Mod: ,,, | Performed by: STUDENT IN AN ORGANIZED HEALTH CARE EDUCATION/TRAINING PROGRAM

## 2022-05-07 PROCEDURE — 96374 THER/PROPH/DIAG INJ IV PUSH: CPT

## 2022-05-07 PROCEDURE — 81025 URINE PREGNANCY TEST: CPT | Performed by: NURSE PRACTITIONER

## 2022-05-07 PROCEDURE — 63600175 PHARM REV CODE 636 W HCPCS: Performed by: NURSE PRACTITIONER

## 2022-05-07 PROCEDURE — 99284 EMERGENCY DEPT VISIT MOD MDM: CPT | Mod: ,,, | Performed by: NURSE PRACTITIONER

## 2022-05-07 RX ORDER — ALBUTEROL SULFATE 90 UG/1
1-2 AEROSOL, METERED RESPIRATORY (INHALATION) EVERY 4 HOURS PRN
Qty: 18 G | Refills: 2 | Status: SHIPPED | OUTPATIENT
Start: 2022-05-07 | End: 2023-05-07

## 2022-05-07 RX ORDER — IPRATROPIUM BROMIDE AND ALBUTEROL SULFATE 2.5; .5 MG/3ML; MG/3ML
6 SOLUTION RESPIRATORY (INHALATION)
Status: COMPLETED | OUTPATIENT
Start: 2022-05-07 | End: 2022-05-07

## 2022-05-07 RX ORDER — PREDNISONE 50 MG/1
50 TABLET ORAL DAILY
Qty: 5 TABLET | Refills: 0 | Status: SHIPPED | OUTPATIENT
Start: 2022-05-07

## 2022-05-07 RX ORDER — METHYLPREDNISOLONE SOD SUCC 125 MG
125 VIAL (EA) INJECTION
Status: COMPLETED | OUTPATIENT
Start: 2022-05-07 | End: 2022-05-07

## 2022-05-07 RX ADMIN — IPRATROPIUM BROMIDE AND ALBUTEROL SULFATE 6 ML: .5; 3 SOLUTION RESPIRATORY (INHALATION) at 09:05

## 2022-05-07 RX ADMIN — METHYLPREDNISOLONE SODIUM SUCCINATE 125 MG: 125 INJECTION, POWDER, FOR SOLUTION INTRAMUSCULAR; INTRAVENOUS at 09:05

## 2022-05-07 NOTE — ED PROVIDER NOTES
Encounter Date: 5/7/2022       History     Chief Complaint   Patient presents with    Shortness of Breath     41-year-old female presents ambulatory to the emergency department complaint of wheezing that started upon arising this morning.  Denies fever, chills or any other sign or symptom of infection.  She denies cough.  States that she does use tobacco products on occasion.  States that she does have known asthma.  States that she does not take routine medications.  She does not have a primary care provider.  States she just comes emergency room for care.        Review of patient's allergies indicates:  No Known Allergies  Past Medical History:   Diagnosis Date    Asthma     COPD (chronic obstructive pulmonary disease)     Emphysema of lung      History reviewed. No pertinent surgical history.  History reviewed. No pertinent family history.  Social History     Tobacco Use    Smoking status: Current Every Day Smoker     Packs/day: 0.50     Types: Cigarettes    Smokeless tobacco: Never Used   Substance Use Topics    Alcohol use: Not Currently    Drug use: Never     Review of Systems   Constitutional: Negative for activity change, fatigue and fever.   Respiratory: Positive for chest tightness, shortness of breath and wheezing. Negative for cough and choking.    Cardiovascular: Negative for chest pain, palpitations and leg swelling.   Gastrointestinal: Negative for abdominal pain, constipation, diarrhea, nausea and vomiting.   Genitourinary: Negative for decreased urine volume and dysuria.   Neurological: Negative for dizziness, syncope, weakness and light-headedness.       Physical Exam     Initial Vitals [05/07/22 0924]   BP Pulse Resp Temp SpO2   125/80 102 19 98.3 °F (36.8 °C) 98 %      MAP       --         Physical Exam    Constitutional: She appears well-developed and well-nourished.   HENT:   Head: Normocephalic and atraumatic.   Eyes: Pupils are equal, round, and reactive to light.   Neck: Neck supple.    Cardiovascular: Regular rhythm, normal heart sounds and intact distal pulses.   Sinus tachycardia with rate of 102 beats per minute   Pulmonary/Chest: No stridor. No respiratory distress. She has wheezes. She has no rhonchi. She has no rales. She exhibits no tenderness.   Abdominal: Abdomen is soft. Bowel sounds are normal. She exhibits no distension. There is no abdominal tenderness. There is no rebound and no guarding.   Musculoskeletal:      Cervical back: Neck supple.     Neurological: She is alert and oriented to person, place, and time. GCS score is 15. GCS eye subscore is 4. GCS verbal subscore is 5. GCS motor subscore is 6.   Skin: Skin is warm and dry. Capillary refill takes less than 2 seconds.         Medical Screening Exam   See Full Note    ED Course   Procedures  Labs Reviewed   CBC WITH DIFFERENTIAL - Abnormal; Notable for the following components:       Result Value    Neutrophils % 45.0 (*)     Monocytes % 6.3 (*)     Eosinophils % 9.1 (*)     Eosinophils, Absolute 0.79 (*)     All other components within normal limits   BASIC METABOLIC PANEL - Normal   CBC W/ AUTO DIFFERENTIAL    Narrative:     The following orders were created for panel order CBC auto differential.  Procedure                               Abnormality         Status                     ---------                               -----------         ------                     CBC with Differential[[id number]]        Abnormal            Final result                 Please view results for these tests on the individual orders.   POCT URINE PREGNANCY     EKG Readings: (Independently Interpreted)   Initial Reading: No STEMI. Rhythm: Sinus Tachycardia. Heart Rate: 104. Ectopy: No Ectopy. Conduction: Normal.       Imaging Results          X-Ray Chest PA And Lateral (Final result)  Result time 05/07/22 10:42:16    Final result by mEory Bell MD (05/07/22 10:42:16)                 Impression:      No acute cardiopulmonary  process      Electronically signed by: Emory Arabella  Date:    05/07/2022  Time:    10:42             Narrative:    EXAMINATION:  XR CHEST PA AND LATERAL    CLINICAL HISTORY:  .  Shortness of breath    COMPARISON:  December 28, 2021    TECHNIQUE:  Chest x-ray PA and lateral    FINDINGS:  The cardiac silhouette is not enlarged.  There is no mediastinal mass.  There is no pulmonary vascular engorgement.    Lungs and pleural spaces are clear.    There is no acute osseous abnormality.                                 Medications   albuterol-ipratropium 2.5 mg-0.5 mg/3 mL nebulizer solution 6 mL (6 mLs Nebulization Given 5/7/22 0959)   methylPREDNISolone sodium succinate injection 125 mg (125 mg Intravenous Given 5/7/22 0959)                 ED Course as of 05/07/22 1114   Sat May 07, 2022   1026 Lab reviewed [CM]   1110 Feeling much better.  Breath sounds clear to auscultation. [CM]      ED Course User Index  [CM] NEGAR Bailey          Clinical Impression:   Final diagnoses:  [R06.02] SOB (shortness of breath)  [J45.901] Exacerbation of asthma, unspecified asthma severity, unspecified whether persistent (Primary)          ED Disposition Condition    Discharge Stable        ED Prescriptions     Medication Sig Dispense Start Date End Date Auth. Provider    albuterol (PROVENTIL/VENTOLIN HFA) 90 mcg/actuation inhaler Inhale 1-2 puffs into the lungs every 4 (four) hours as needed for Wheezing. Rescue 18 g 5/7/2022 5/7/2023 NEGAR Bailey    predniSONE (DELTASONE) 50 MG Tab Take 1 tablet (50 mg total) by mouth once daily. 5 tablet 5/7/2022  NEGAR Bailey        Follow-up Information    None          NEGAR Bailey  05/07/22 1114       NEGAR Bailey  05/07/22 1442

## 2022-05-07 NOTE — Clinical Note
"Kari "Juice Poon was seen and treated in our emergency department on 5/7/2022.  She may return to work on 05/08/2022.       If you have any questions or concerns, please don't hesitate to call.      NEGAR Bailey"

## 2022-05-07 NOTE — DISCHARGE INSTRUCTIONS
Follow-up with [facility] as instructed.  Call their office and schedule appointment for this coming week.

## 2022-05-09 ENCOUNTER — TELEPHONE (OUTPATIENT)
Dept: EMERGENCY MEDICINE | Facility: HOSPITAL | Age: 42
End: 2022-05-09

## 2022-10-31 ENCOUNTER — HOSPITAL ENCOUNTER (EMERGENCY)
Facility: HOSPITAL | Age: 42
Discharge: HOME | End: 2022-10-31

## 2022-10-31 VITALS
RESPIRATION RATE: 18 BRPM | WEIGHT: 181 LBS | TEMPERATURE: 99 F | BODY MASS INDEX: 32.07 KG/M2 | HEIGHT: 63 IN | OXYGEN SATURATION: 98 % | HEART RATE: 95 BPM | DIASTOLIC BLOOD PRESSURE: 80 MMHG | SYSTOLIC BLOOD PRESSURE: 119 MMHG

## 2022-10-31 DIAGNOSIS — J45.901 EXACERBATION OF ASTHMA, UNSPECIFIED ASTHMA SEVERITY, UNSPECIFIED WHETHER PERSISTENT: ICD-10-CM

## 2022-10-31 DIAGNOSIS — H92.01 RIGHT EAR PAIN: Primary | ICD-10-CM

## 2022-10-31 PROCEDURE — 25000242 PHARM REV CODE 250 ALT 637 W/ HCPCS: Performed by: NURSE PRACTITIONER

## 2022-10-31 PROCEDURE — 99283 EMERGENCY DEPT VISIT LOW MDM: CPT

## 2022-10-31 PROCEDURE — 99283 EMERGENCY DEPT VISIT LOW MDM: CPT | Mod: ,,, | Performed by: NURSE PRACTITIONER

## 2022-10-31 PROCEDURE — 99283 PR EMERGENCY DEPT VISIT,LEVEL III: ICD-10-PCS | Mod: ,,, | Performed by: NURSE PRACTITIONER

## 2022-10-31 RX ORDER — ALBUTEROL SULFATE 90 UG/1
4 AEROSOL, METERED RESPIRATORY (INHALATION)
Status: COMPLETED | OUTPATIENT
Start: 2022-10-31 | End: 2022-10-31

## 2022-10-31 RX ORDER — ALBUTEROL SULFATE 90 UG/1
1-2 AEROSOL, METERED RESPIRATORY (INHALATION) EVERY 4 HOURS PRN
Qty: 8 G | Refills: 0 | Status: SHIPPED | OUTPATIENT
Start: 2022-10-31 | End: 2023-10-31

## 2022-10-31 RX ORDER — CETIRIZINE HYDROCHLORIDE 10 MG/1
10 TABLET ORAL DAILY
Qty: 30 TABLET | Refills: 0 | Status: SHIPPED | OUTPATIENT
Start: 2022-10-31 | End: 2023-10-31

## 2022-10-31 RX ORDER — FLUTICASONE PROPIONATE 50 MCG
1 SPRAY, SUSPENSION (ML) NASAL 2 TIMES DAILY PRN
Qty: 15 G | Refills: 0 | Status: SHIPPED | OUTPATIENT
Start: 2022-10-31

## 2022-10-31 RX ADMIN — ALBUTEROL SULFATE 4 PUFF: 90 AEROSOL, METERED RESPIRATORY (INHALATION) at 10:10

## 2022-10-31 NOTE — ED TRIAGE NOTES
Patient present to ED for shortness of breath and Right Ear Pain.  Patient has history of asthma and has been out of her inhaler for 2 weeks.  No signs of respiratory distress.

## 2022-10-31 NOTE — ED PROVIDER NOTES
Encounter Date: 10/31/2022       History     Chief Complaint   Patient presents with    Shortness of Breath    Otalgia     42 year old female presents to the emergency department to be evaluated for right ear pain. She is also short of breath. She has a history of asthma, smokes, ran out of her inhaler last week. Denies any cough, runny nose, fever.     The history is provided by the patient.   Shortness of Breath  Associated symptoms include ear pain. Pertinent negatives include no headaches, no rhinorrhea, no sore throat, no neck pain, no cough, no vomiting, no abdominal pain and no rash. Associated medical issues include asthma.   Otalgia  This is a new problem. Pertinent negatives include no ear discharge, no headaches, no hearing loss, no rhinorrhea, no sore throat, no abdominal pain, no diarrhea, no vomiting, no neck pain, no cough and no rash.   Review of patient's allergies indicates:  No Known Allergies  Past Medical History:   Diagnosis Date    Asthma     COPD (chronic obstructive pulmonary disease)     Emphysema of lung      History reviewed. No pertinent surgical history.  History reviewed. No pertinent family history.  Social History     Tobacco Use    Smoking status: Every Day     Packs/day: 0.50     Types: Cigarettes    Smokeless tobacco: Never   Substance Use Topics    Alcohol use: Not Currently    Drug use: Never     Review of Systems   HENT:  Positive for ear pain. Negative for ear discharge, hearing loss, rhinorrhea and sore throat.    Respiratory:  Positive for shortness of breath. Negative for cough.    Gastrointestinal:  Negative for abdominal pain, diarrhea and vomiting.   Musculoskeletal:  Negative for neck pain.   Skin:  Negative for rash.   Neurological:  Negative for headaches.   All other systems reviewed and are negative.    Physical Exam     Initial Vitals [10/31/22 0927]   BP Pulse Resp Temp SpO2   119/80 95 18 98.9 °F (37.2 °C) 98 %      MAP       --         Physical Exam    Vitals  reviewed.  Constitutional: She appears well-developed and well-nourished.   HENT:   Right Ear: Tympanic membrane normal.   Left Ear: Tympanic membrane normal.   Mouth/Throat: Oropharynx is clear and moist.   Neck: Neck supple.   Cardiovascular:  Normal rate and regular rhythm.           Pulmonary/Chest: She has wheezes.   Abdominal: Abdomen is soft. Bowel sounds are normal. She exhibits no distension and no mass. There is no abdominal tenderness. There is no rebound and no guarding.   Musculoskeletal:         General: Normal range of motion.      Cervical back: Neck supple.     Neurological: She is alert and oriented to person, place, and time. She has normal strength. GCS score is 15. GCS eye subscore is 4. GCS verbal subscore is 5. GCS motor subscore is 6.   Skin: Skin is warm and dry. Capillary refill takes less than 2 seconds.   Psychiatric: She has a normal mood and affect.       Medical Screening Exam   See Full Note    ED Course   Procedures  Labs Reviewed - No data to display       Imaging Results    None          Medications   albuterol inhaler 4 puff (has no administration in time range)                       Clinical Impression:   Final diagnoses:  [H92.01] Right ear pain (Primary)  [J45.901] Exacerbation of asthma, unspecified asthma severity, unspecified whether persistent      ED Disposition Condition    Discharge Stable          ED Prescriptions       Medication Sig Dispense Start Date End Date Auth. Provider    fluticasone propionate (FLONASE) 50 mcg/actuation nasal spray 1 spray (50 mcg total) by Each Nostril route 2 (two) times daily as needed. 15 g 10/31/2022 -- NEGAR Doe    albuterol (PROVENTIL/VENTOLIN HFA) 90 mcg/actuation inhaler Inhale 1-2 puffs into the lungs every 4 (four) hours as needed for Wheezing. Rescue 8 g 10/31/2022 10/31/2023 NEGAR Doe    cetirizine (ZYRTEC) 10 MG tablet Take 1 tablet (10 mg total) by mouth once daily. 30 tablet 10/31/2022 10/31/2023  Jessica Harris, NEGAR          Follow-up Information    None          Jessica Harris, NEGAR  10/31/22 1012

## 2022-10-31 NOTE — PROVIDER PROGRESS NOTES - EMERGENCY DEPT.
Encounter Date: 10/31/2022    ED Physician Progress Notes        Patient said she feels much better and needs to go now

## 2022-10-31 NOTE — DISCHARGE INSTRUCTIONS
Stop smoking. Follow up with your primary care provider in 2 days. Return to the emergency department for any increase in symptoms or for any other new or worrisome symptoms.

## 2022-10-31 NOTE — Clinical Note
"Kari"Juice Poon was seen and treated in our emergency department on 10/31/2022.  She may return to work on 11/01/2022.       If you have any questions or concerns, please don't hesitate to call.      NEGAR Doe"

## 2022-11-01 ENCOUNTER — TELEPHONE (OUTPATIENT)
Dept: EMERGENCY MEDICINE | Facility: HOSPITAL | Age: 42
End: 2022-11-01

## 2023-01-10 ENCOUNTER — HOSPITAL ENCOUNTER (EMERGENCY)
Facility: HOSPITAL | Age: 43
Discharge: HOME | End: 2023-01-10
Attending: EMERGENCY MEDICINE

## 2023-01-10 VITALS
SYSTOLIC BLOOD PRESSURE: 138 MMHG | HEART RATE: 87 BPM | BODY MASS INDEX: 31.89 KG/M2 | RESPIRATION RATE: 19 BRPM | TEMPERATURE: 99 F | HEIGHT: 63 IN | DIASTOLIC BLOOD PRESSURE: 87 MMHG | WEIGHT: 180 LBS | OXYGEN SATURATION: 97 %

## 2023-01-10 DIAGNOSIS — J98.01 BRONCHOSPASM: ICD-10-CM

## 2023-01-10 DIAGNOSIS — J45.901 MODERATE ASTHMA WITH ACUTE EXACERBATION, UNSPECIFIED WHETHER PERSISTENT: ICD-10-CM

## 2023-01-10 DIAGNOSIS — J40 BRONCHITIS: Primary | ICD-10-CM

## 2023-01-10 LAB
ALBUMIN SERPL BCP-MCNC: 3.8 G/DL (ref 3.5–5)
ALBUMIN/GLOB SERPL: 0.9 {RATIO}
ALP SERPL-CCNC: 80 U/L (ref 37–98)
ALT SERPL W P-5'-P-CCNC: 13 U/L (ref 13–56)
ANION GAP SERPL CALCULATED.3IONS-SCNC: 16 MMOL/L (ref 7–16)
AST SERPL W P-5'-P-CCNC: 14 U/L (ref 15–37)
BASOPHILS # BLD AUTO: 0.06 K/UL (ref 0–0.2)
BASOPHILS NFR BLD AUTO: 0.8 % (ref 0–1)
BILIRUB SERPL-MCNC: 0.2 MG/DL (ref ?–1.2)
BUN SERPL-MCNC: 6 MG/DL (ref 7–18)
BUN/CREAT SERPL: 10 (ref 6–20)
CALCIUM SERPL-MCNC: 9.3 MG/DL (ref 8.5–10.1)
CHLORIDE SERPL-SCNC: 106 MMOL/L (ref 98–107)
CO2 SERPL-SCNC: 24 MMOL/L (ref 21–32)
CREAT SERPL-MCNC: 0.62 MG/DL (ref 0.55–1.02)
DIFFERENTIAL METHOD BLD: ABNORMAL
EGFR (NO RACE VARIABLE) (RUSH/TITUS): 113 ML/MIN/1.73M²
EOSINOPHIL # BLD AUTO: 0.38 K/UL (ref 0–0.5)
EOSINOPHIL NFR BLD AUTO: 5.1 % (ref 1–4)
ERYTHROCYTE [DISTWIDTH] IN BLOOD BY AUTOMATED COUNT: 14.6 % (ref 11.5–14.5)
FLUAV AG UPPER RESP QL IA.RAPID: NEGATIVE
FLUBV AG UPPER RESP QL IA.RAPID: NEGATIVE
GLOBULIN SER-MCNC: 4.2 G/DL (ref 2–4)
GLUCOSE SERPL-MCNC: 107 MG/DL (ref 74–106)
HCT VFR BLD AUTO: 45.5 % (ref 38–47)
HGB BLD-MCNC: 15 G/DL (ref 12–16)
IMM GRANULOCYTES # BLD AUTO: 0.02 K/UL (ref 0–0.04)
IMM GRANULOCYTES NFR BLD: 0.3 % (ref 0–0.4)
LYMPHOCYTES # BLD AUTO: 2.17 K/UL (ref 1–4.8)
LYMPHOCYTES NFR BLD AUTO: 29.2 % (ref 27–41)
MAGNESIUM SERPL-MCNC: 2.1 MG/DL (ref 1.7–2.3)
MCH RBC QN AUTO: 29.6 PG (ref 27–31)
MCHC RBC AUTO-ENTMCNC: 33 G/DL (ref 32–36)
MCV RBC AUTO: 89.9 FL (ref 80–96)
MONOCYTES # BLD AUTO: 0.47 K/UL (ref 0–0.8)
MONOCYTES NFR BLD AUTO: 6.3 % (ref 2–6)
MPC BLD CALC-MCNC: 11.1 FL (ref 9.4–12.4)
NEUTROPHILS # BLD AUTO: 4.34 K/UL (ref 1.8–7.7)
NEUTROPHILS NFR BLD AUTO: 58.3 % (ref 53–65)
NRBC # BLD AUTO: 0 X10E3/UL
NRBC, AUTO (.00): 0 %
NT-PROBNP SERPL-MCNC: 90 PG/ML (ref 1–125)
PLATELET # BLD AUTO: 299 K/UL (ref 150–400)
POTASSIUM SERPL-SCNC: 4 MMOL/L (ref 3.5–5.1)
PROT SERPL-MCNC: 8 G/DL (ref 6.4–8.2)
RBC # BLD AUTO: 5.06 M/UL (ref 4.2–5.4)
SARS-COV+SARS-COV-2 AG RESP QL IA.RAPID: NEGATIVE
SODIUM SERPL-SCNC: 142 MMOL/L (ref 136–145)
WBC # BLD AUTO: 7.44 K/UL (ref 4.5–11)

## 2023-01-10 PROCEDURE — 83735 ASSAY OF MAGNESIUM: CPT | Performed by: EMERGENCY MEDICINE

## 2023-01-10 PROCEDURE — 99284 EMERGENCY DEPT VISIT MOD MDM: CPT | Mod: ,,, | Performed by: EMERGENCY MEDICINE

## 2023-01-10 PROCEDURE — 25000242 PHARM REV CODE 250 ALT 637 W/ HCPCS: Performed by: EMERGENCY MEDICINE

## 2023-01-10 PROCEDURE — 83880 ASSAY OF NATRIURETIC PEPTIDE: CPT | Performed by: EMERGENCY MEDICINE

## 2023-01-10 PROCEDURE — 63600175 PHARM REV CODE 636 W HCPCS: Performed by: EMERGENCY MEDICINE

## 2023-01-10 PROCEDURE — 87428 SARSCOV & INF VIR A&B AG IA: CPT | Performed by: EMERGENCY MEDICINE

## 2023-01-10 PROCEDURE — 85025 COMPLETE CBC W/AUTO DIFF WBC: CPT | Performed by: EMERGENCY MEDICINE

## 2023-01-10 PROCEDURE — 96374 THER/PROPH/DIAG INJ IV PUSH: CPT

## 2023-01-10 PROCEDURE — 80053 COMPREHEN METABOLIC PANEL: CPT | Performed by: EMERGENCY MEDICINE

## 2023-01-10 PROCEDURE — 96375 TX/PRO/DX INJ NEW DRUG ADDON: CPT

## 2023-01-10 PROCEDURE — 99284 PR EMERGENCY DEPT VISIT,LEVEL IV: ICD-10-PCS | Mod: ,,, | Performed by: EMERGENCY MEDICINE

## 2023-01-10 PROCEDURE — 99284 EMERGENCY DEPT VISIT MOD MDM: CPT | Mod: 25

## 2023-01-10 RX ORDER — ALBUTEROL SULFATE 90 UG/1
1-2 AEROSOL, METERED RESPIRATORY (INHALATION) EVERY 6 HOURS PRN
Qty: 6.7 G | Refills: 1 | Status: SHIPPED | OUTPATIENT
Start: 2023-01-10 | End: 2024-01-10

## 2023-01-10 RX ORDER — IPRATROPIUM BROMIDE AND ALBUTEROL SULFATE 2.5; .5 MG/3ML; MG/3ML
3 SOLUTION RESPIRATORY (INHALATION)
Status: COMPLETED | OUTPATIENT
Start: 2023-01-10 | End: 2023-01-10

## 2023-01-10 RX ORDER — AZITHROMYCIN 250 MG/1
250 TABLET, FILM COATED ORAL DAILY
Qty: 6 TABLET | Refills: 0 | Status: SHIPPED | OUTPATIENT
Start: 2023-01-10

## 2023-01-10 RX ORDER — PREDNISONE 50 MG/1
50 TABLET ORAL DAILY
Qty: 6 TABLET | Refills: 0 | Status: SHIPPED | OUTPATIENT
Start: 2023-01-10

## 2023-01-10 RX ORDER — IPRATROPIUM BROMIDE AND ALBUTEROL SULFATE 2.5; .5 MG/3ML; MG/3ML
6 SOLUTION RESPIRATORY (INHALATION)
Status: COMPLETED | OUTPATIENT
Start: 2023-01-10 | End: 2023-01-10

## 2023-01-10 RX ORDER — PROMETHAZINE HYDROCHLORIDE 25 MG/1
25 TABLET ORAL EVERY 6 HOURS PRN
Qty: 16 TABLET | Refills: 0 | Status: SHIPPED | OUTPATIENT
Start: 2023-01-10

## 2023-01-10 RX ORDER — ONDANSETRON 2 MG/ML
4 INJECTION INTRAMUSCULAR; INTRAVENOUS
Status: COMPLETED | OUTPATIENT
Start: 2023-01-10 | End: 2023-01-10

## 2023-01-10 RX ORDER — METHYLPREDNISOLONE SOD SUCC 125 MG
125 VIAL (EA) INJECTION
Status: COMPLETED | OUTPATIENT
Start: 2023-01-10 | End: 2023-01-10

## 2023-01-10 RX ADMIN — METHYLPREDNISOLONE SODIUM SUCCINATE 125 MG: 125 INJECTION, POWDER, FOR SOLUTION INTRAMUSCULAR; INTRAVENOUS at 06:01

## 2023-01-10 RX ADMIN — ONDANSETRON 4 MG: 2 INJECTION INTRAMUSCULAR; INTRAVENOUS at 07:01

## 2023-01-10 RX ADMIN — IPRATROPIUM BROMIDE AND ALBUTEROL SULFATE 3 ML: .5; 3 SOLUTION RESPIRATORY (INHALATION) at 08:01

## 2023-01-10 RX ADMIN — IPRATROPIUM BROMIDE AND ALBUTEROL SULFATE 6 ML: .5; 3 SOLUTION RESPIRATORY (INHALATION) at 06:01

## 2023-01-10 NOTE — DISCHARGE INSTRUCTIONS
Take medications as prescribed.  Return to emergency department for any worsening or further problems.  Follow-up in clinic at the [facility] as soon as possible.

## 2023-01-10 NOTE — ED PROVIDER NOTES
Encounter Date: 1/10/2023       History     Chief Complaint   Patient presents with    Chills    Cough    Nasal Congestion    Generalized Body Aches     Pt states onset a weel     43 Y/O MALE WITH BODY ACHES, CONGESTION FOR SEVERAL DAYS.  SHE HAS WHEEZED IN THE PAST, AND SHE PRESENTS WITH SHORTNESS OF BREATH AND WHEEZING SINCE YESTERDAY.      Review of patient's allergies indicates:  No Known Allergies  Past Medical History:   Diagnosis Date    Asthma     COPD (chronic obstructive pulmonary disease)     Emphysema of lung      History reviewed. No pertinent surgical history.  History reviewed. No pertinent family history.  Social History     Tobacco Use    Smoking status: Every Day     Packs/day: 0.50     Types: Cigarettes    Smokeless tobacco: Never   Substance Use Topics    Alcohol use: Not Currently    Drug use: Never     Review of Systems   All other systems reviewed and are negative.    Physical Exam     Initial Vitals [01/10/23 0504]   BP Pulse Resp Temp SpO2   138/87 87 20 98.6 °F (37 °C) 97 %      MAP       --         Physical Exam    Nursing note and vitals reviewed.  Constitutional: She appears well-developed and well-nourished.   HENT:   Head: Normocephalic and atraumatic.   Nose: Nose normal.   Mouth/Throat: Oropharynx is clear and moist.   Eyes: Conjunctivae and EOM are normal. Pupils are equal, round, and reactive to light.   Neck: Neck supple.   Normal range of motion.  Cardiovascular:  Normal rate, regular rhythm and normal heart sounds.           Pulmonary/Chest: She has wheezes.   Abdominal: Abdomen is soft. Bowel sounds are normal.   Musculoskeletal:         General: Normal range of motion.      Cervical back: Normal range of motion and neck supple.     Neurological: She is alert and oriented to person, place, and time. GCS score is 15. GCS eye subscore is 4. GCS verbal subscore is 5. GCS motor subscore is 6.   Skin: Skin is warm and dry. Capillary refill takes less than 2 seconds.   Psychiatric:  She has a normal mood and affect.       Medical Screening Exam   See Full Note    ED Course   Procedures  Labs Reviewed   COMPREHENSIVE METABOLIC PANEL - Abnormal; Notable for the following components:       Result Value    Glucose 107 (*)     BUN 6 (*)     Globulin 4.2 (*)     AST 14 (*)     All other components within normal limits   CBC WITH DIFFERENTIAL - Abnormal; Notable for the following components:    RDW 14.6 (*)     Monocytes % 6.3 (*)     Eosinophils % 5.1 (*)     All other components within normal limits   NT-PRO NATRIURETIC PEPTIDE - Normal   MAGNESIUM - Normal   SARS-COV2 (COVID) W/ FLU ANTIGEN - Normal    Narrative:     Negative SARS-CoV results should not be used as the sole basis for treatment or patient management decisions; negative results should be considered in the context of a patient's recent exposures, history and the presene of clinical signs and symptoms consistent with COVID-19.  Negative results should be treated as presumptive and confirmed by molecular assay, if necessary for patient management.   CBC W/ AUTO DIFFERENTIAL    Narrative:     The following orders were created for panel order CBC auto differential.  Procedure                               Abnormality         Status                     ---------                               -----------         ------                     CBC with Differential[[id number]]        Abnormal            Final result                 Please view results for these tests on the individual orders.   EXTRA TUBES    Narrative:     The following orders were created for panel order EXTRA TUBES.  Procedure                               Abnormality         Status                     ---------                               -----------         ------                     Light Blue Top Hold[[id number]]                              In process                   Please view results for these tests on the individual orders.   LIGHT BLUE TOP HOLD   EXTRA TUBES     Narrative:     The following orders were created for panel order EXTRA TUBES.  Procedure                               Abnormality         Status                     ---------                               -----------         ------                     Light Green Top Hold[[id number]]                             In process                   Please view results for these tests on the individual orders.   LIGHT GREEN TOP HOLD          Imaging Results              X-Ray Chest AP Portable (Final result)  Result time 01/10/23 07:50:37      Final result by Clinton Real II, MD (01/10/23 07:50:37)                   Impression:      Findings suggest mild cardiac decompensation and / or pneumonitis.      Electronically signed by: Clinton Real  Date:    01/10/2023  Time:    07:50               Narrative:    EXAMINATION:  XR CHEST AP PORTABLE    CLINICAL HISTORY:  Acute bronchospasm    COMPARISON:  7 May 2022    FINDINGS:  The heart and mediastinum are stable in size and configuration.  The pulmonary vascularity is slightly increased with bilateral increased interstitial lung density.  No other lung infiltrates, effusions, pneumothorax or other abnormality is demonstrated.                                       Medications   albuterol-ipratropium 2.5 mg-0.5 mg/3 mL nebulizer solution 3 mL (has no administration in time range)   albuterol-ipratropium 2.5 mg-0.5 mg/3 mL nebulizer solution 6 mL (6 mLs Nebulization Given 1/10/23 0617)   methylPREDNISolone sodium succinate injection 125 mg (125 mg Intravenous Given 1/10/23 0617)   ondansetron injection 4 mg (4 mg Intravenous Given 1/10/23 0742)                 ED Course as of 01/10/23 0810   Tue Puneet 10, 2023   0734 Influenza A: Negative [BB]   0734 Influenza B, Molecular: Negative [BB]   0734 COVID-19 Ag: Negative [BB]   0734 NT-proBNP: 90 [BB]   0734 Magnesium: 2.1 [BB]   0734 Glucose(!): 107 [BB]   0734 BUN(!): 6 [BB]   0734 WBC: 7.44 [BB]   0734 Hemoglobin: 15.0 [BB]   0734  Hematocrit: 45.5  Labs all reviewed.  COVID and flu tests are negative.  CBC is unremarkable.  CMP is also unremarkable. [BB]   0734 Chest x-ray interpreted by me shows no acute abnormality. [BB]   0801 On my examination patient still has wheezes but states she is improved from when she 1st got here. [BB]   0801 Patient tells me that she has been out of her albuterol inhaler.  Patient does not have a primary care provider and states she has no insurance and can not afford to go to clinic.  We will refer her to the [facility]. [BB]      ED Course User Index  [BB] Rob Randall MD          Clinical Impression:   Final diagnoses:  [J98.01] Bronchospasm  [J40] Bronchitis (Primary)  [J45.901] Moderate asthma with acute exacerbation, unspecified whether persistent        ED Disposition Condition    Discharge Stable          ED Prescriptions       Medication Sig Dispense Start Date End Date Auth. Provider    predniSONE (DELTASONE) 50 MG Tab Take 1 tablet (50 mg total) by mouth once daily. 6 tablet 1/10/2023 -- Rob Randall MD    albuterol (PROVENTIL/VENTOLIN HFA) 90 mcg/actuation inhaler Inhale 1-2 puffs into the lungs every 6 (six) hours as needed for Wheezing. Rescue 6.7 g 1/10/2023 1/10/2024 Rob Randall MD    azithromycin (Z-ANGELY) 250 MG tablet Take 1 tablet (250 mg total) by mouth once daily. Take first 2 tablets together, then 1 every day until finished. 6 tablet 1/10/2023 -- Rob Randall MD    promethazine (PHENERGAN) 25 MG tablet Take 1 tablet (25 mg total) by mouth every 6 (six) hours as needed for Nausea. 16 tablet 1/10/2023 -- Rob Randall MD          Follow-up Information       Follow up With Specialties Details Why Contact Info    [facility]  Call  Schedule an appointment as soon as possible. [address] [place] [address] [phone]               Rob Randall MD  01/10/23 3422

## 2023-01-10 NOTE — Clinical Note
"Kari "Juice Poon was seen and treated in our emergency department on 1/10/2023.  She may return to work on 01/11/2023.       If you have any questions or concerns, please don't hesitate to call.      Danial Grubbs MD"

## 2023-03-28 ENCOUNTER — PATIENT MESSAGE (OUTPATIENT)
Dept: RESEARCH | Facility: HOSPITAL | Age: 43
End: 2023-03-28

## 2023-10-12 ENCOUNTER — HOSPITAL ENCOUNTER (EMERGENCY)
Facility: HOSPITAL | Age: 43
Discharge: HOME | End: 2023-10-12
Attending: FAMILY MEDICINE
Payer: COMMERCIAL

## 2023-10-12 VITALS
BODY MASS INDEX: 27.46 KG/M2 | WEIGHT: 155 LBS | HEART RATE: 80 BPM | OXYGEN SATURATION: 99 % | HEIGHT: 63 IN | SYSTOLIC BLOOD PRESSURE: 143 MMHG | DIASTOLIC BLOOD PRESSURE: 88 MMHG | RESPIRATION RATE: 18 BRPM | TEMPERATURE: 98 F

## 2023-10-12 DIAGNOSIS — J06.9 UPPER RESPIRATORY TRACT INFECTION, UNSPECIFIED TYPE: ICD-10-CM

## 2023-10-12 DIAGNOSIS — R05.9 COUGH, UNSPECIFIED TYPE: Primary | ICD-10-CM

## 2023-10-12 PROCEDURE — 99283 EMERGENCY DEPT VISIT LOW MDM: CPT | Mod: ,,, | Performed by: FAMILY MEDICINE

## 2023-10-12 PROCEDURE — 25000242 PHARM REV CODE 250 ALT 637 W/ HCPCS: Performed by: FAMILY MEDICINE

## 2023-10-12 PROCEDURE — 99283 PR EMERGENCY DEPT VISIT,LEVEL III: ICD-10-PCS | Mod: ,,, | Performed by: FAMILY MEDICINE

## 2023-10-12 PROCEDURE — 96374 THER/PROPH/DIAG INJ IV PUSH: CPT

## 2023-10-12 PROCEDURE — 99285 EMERGENCY DEPT VISIT HI MDM: CPT | Mod: 25

## 2023-10-12 PROCEDURE — 63600175 PHARM REV CODE 636 W HCPCS: Performed by: FAMILY MEDICINE

## 2023-10-12 RX ORDER — IPRATROPIUM BROMIDE AND ALBUTEROL SULFATE 2.5; .5 MG/3ML; MG/3ML
3 SOLUTION RESPIRATORY (INHALATION)
Status: COMPLETED | OUTPATIENT
Start: 2023-10-12 | End: 2023-10-12

## 2023-10-12 RX ORDER — CEPHALEXIN 500 MG/1
500 CAPSULE ORAL 4 TIMES DAILY
Qty: 30 CAPSULE | Refills: 0 | Status: SHIPPED | OUTPATIENT
Start: 2023-10-12 | End: 2023-10-20

## 2023-10-12 RX ORDER — ALBUTEROL SULFATE 90 UG/1
2 AEROSOL, METERED RESPIRATORY (INHALATION) EVERY 4 HOURS PRN
Qty: 18 G | Refills: 2 | Status: SHIPPED | OUTPATIENT
Start: 2023-10-12 | End: 2023-11-11

## 2023-10-12 RX ORDER — METHYLPREDNISOLONE SOD SUCC 125 MG
125 VIAL (EA) INJECTION
Status: COMPLETED | OUTPATIENT
Start: 2023-10-12 | End: 2023-10-12

## 2023-10-12 RX ORDER — METHYLPREDNISOLONE 4 MG/1
TABLET ORAL
Qty: 21 EACH | Refills: 0 | Status: SHIPPED | OUTPATIENT
Start: 2023-10-12 | End: 2023-11-02

## 2023-10-12 RX ADMIN — IPRATROPIUM BROMIDE AND ALBUTEROL SULFATE 3 ML: .5; 3 SOLUTION RESPIRATORY (INHALATION) at 06:10

## 2023-10-12 RX ADMIN — METHYLPREDNISOLONE SODIUM SUCCINATE 125 MG: 125 INJECTION INTRAMUSCULAR; INTRAVENOUS at 06:10

## 2023-10-12 NOTE — Clinical Note
"Kari "Juice Poon was seen and treated in our emergency department on 10/12/2023.  She may return to work on 10/16/2023.       If you have any questions or concerns, please don't hesitate to call.      Michael Ireland, DO"

## 2023-10-12 NOTE — ED PROVIDER NOTES
Encounter Date: 10/12/2023       History     Chief Complaint   Patient presents with    Cough    Nasal Congestion    Wheezing     Pt states she has been out of her home meds for a month or longer     Patient comes in with acute wheezing.  History of asthma cough congestion.        Review of patient's allergies indicates:  No Known Allergies  Past Medical History:   Diagnosis Date    Asthma     COPD (chronic obstructive pulmonary disease)     Emphysema of lung      History reviewed. No pertinent surgical history.  History reviewed. No pertinent family history.  Social History     Tobacco Use    Smoking status: Every Day     Current packs/day: 0.50     Types: Cigarettes    Smokeless tobacco: Never   Substance Use Topics    Alcohol use: Yes    Drug use: Never     Review of Systems   Constitutional: Negative.  Negative for fever.   HENT: Negative.  Negative for sore throat.    Eyes: Negative.    Respiratory:  Positive for cough, shortness of breath and wheezing.    Cardiovascular: Negative.  Negative for chest pain.   Gastrointestinal: Negative.  Negative for nausea.   Endocrine: Negative.    Genitourinary: Negative.  Negative for dysuria.   Musculoskeletal: Negative.  Negative for back pain.   Skin: Negative.  Negative for rash.   Allergic/Immunologic: Negative.    Neurological: Negative.  Negative for weakness.   Hematological: Negative.  Does not bruise/bleed easily.   Psychiatric/Behavioral: Negative.     All other systems reviewed and are negative.      Physical Exam     Initial Vitals [10/12/23 0626]   BP Pulse Resp Temp SpO2   (!) 143/85 84 18 97.9 °F (36.6 °C) 99 %      MAP       --         Physical Exam    Constitutional: She appears well-developed and well-nourished.   HENT:   Head: Normocephalic and atraumatic.   Right Ear: External ear normal.   Left Ear: External ear normal.   Nose: Nose normal.   Mouth/Throat: Oropharynx is clear and moist.   Eyes: Conjunctivae and EOM are normal. Pupils are equal, round,  and reactive to light.   Neck: Neck supple.   Normal range of motion.  Cardiovascular:  Normal rate, regular rhythm, normal heart sounds and intact distal pulses.           Pulmonary/Chest: She has wheezes.   Abdominal: Abdomen is soft. Bowel sounds are normal.   Genitourinary:    Vagina and uterus normal.     Musculoskeletal:         General: Normal range of motion.      Cervical back: Normal range of motion and neck supple.     Neurological: She is alert and oriented to person, place, and time. She has normal strength and normal reflexes.   Skin: Skin is warm. Capillary refill takes less than 2 seconds.   Psychiatric: She has a normal mood and affect. Her behavior is normal. Judgment and thought content normal.         Medical Screening Exam   See Full Note    ED Course   Procedures  Labs Reviewed - No data to display       Imaging Results              X-Ray Chest AP Portable (Final result)  Result time 10/12/23 07:50:25      Final result by Bean Noble DO (10/12/23 07:50:25)                   Impression:      No acute pulmonary disease      Electronically signed by: Bean Noble  Date:    10/12/2023  Time:    07:50               Narrative:    EXAMINATION:  XR CHEST AP PORTABLE    CLINICAL HISTORY:  wheezing;    TECHNIQUE:  XR CHEST AP PORTABLE    COMPARISON:  1/10/23    FINDINGS:  No lines or tubes.    Lungs are clear.    Normal pleura.    Cardiac silhouette is similar to comparison exam.    No obvious acute bone findings.                                       Medications   albuterol-ipratropium 2.5 mg-0.5 mg/3 mL nebulizer solution 3 mL (3 mLs Nebulization Given 10/12/23 0643)   methylPREDNISolone sodium succinate injection 125 mg (125 mg Intravenous Given 10/12/23 0635)     Medical Decision Making  Amount and/or Complexity of Data Reviewed  Radiology: ordered.    Risk  Prescription drug management.                          Medical Decision Making:   Initial Assessment:   Patient in with wheezing  history of asthma COPD  Differential Diagnosis:   Chest x-ray negative acute exacerbation of COPD and asthma.  ED Management:  Continue breathing machine at home.  Medrol dose written and Keflex.      Clinical Impression:   Final diagnoses:  [R05.9] Cough, unspecified type (Primary)  [J06.9] Upper respiratory tract infection, unspecified type        ED Disposition Condition    Discharge Stable          ED Prescriptions       Medication Sig Dispense Start Date End Date Auth. Provider    albuterol (PROVENTIL/VENTOLIN HFA) 90 mcg/actuation inhaler Inhale 2 puffs into the lungs every 4 (four) hours as needed for Wheezing. Rescue 18 g 10/12/2023 11/11/2023 Michael Ireland DO    methylPREDNISolone (MEDROL DOSEPACK) 4 mg tablet use as directed 21 each 10/12/2023 11/2/2023 Michael Ireland DO    cephALEXin (KEFLEX) 500 MG capsule Take 1 capsule (500 mg total) by mouth 4 (four) times daily. for 30 doses 30 capsule 10/12/2023 10/20/2023 Michael Ireland DO          Follow-up Information    None          Michael Ireland DO  10/12/23 2852

## 2025-02-23 ENCOUNTER — HOSPITAL ENCOUNTER (EMERGENCY)
Facility: HOSPITAL | Age: 45
Discharge: HOME | End: 2025-02-23
Payer: COMMERCIAL

## 2025-02-23 VITALS
HEIGHT: 63 IN | RESPIRATION RATE: 16 BRPM | SYSTOLIC BLOOD PRESSURE: 147 MMHG | WEIGHT: 143 LBS | HEART RATE: 93 BPM | OXYGEN SATURATION: 99 % | DIASTOLIC BLOOD PRESSURE: 77 MMHG | BODY MASS INDEX: 25.34 KG/M2 | TEMPERATURE: 98 F

## 2025-02-23 DIAGNOSIS — S01.511A LIP LACERATION, INITIAL ENCOUNTER: Primary | ICD-10-CM

## 2025-02-23 PROCEDURE — 99283 EMERGENCY DEPT VISIT LOW MDM: CPT

## 2025-02-23 RX ORDER — CEPHALEXIN 500 MG/1
500 CAPSULE ORAL EVERY 8 HOURS
Qty: 30 CAPSULE | Refills: 0 | Status: SHIPPED | OUTPATIENT
Start: 2025-02-23 | End: 2025-03-05

## 2025-02-23 NOTE — ED TRIAGE NOTES
"Pt presents to the ED with c/o mouth pain after "playing around" last night and elbowed in the face and her teeth went through right side of her lip and inside of right cheek.   "

## 2025-02-23 NOTE — ED PROVIDER NOTES
Encounter Date: 2/23/2025       History     Chief Complaint   Patient presents with    Mouth Injury     Pt presents with an injury to right upper lip after an elbow hit her last night. Laceration noted to right upper inner lip with swelling.         Review of patient's allergies indicates:  No Known Allergies  Past Medical History:   Diagnosis Date    Asthma     COPD (chronic obstructive pulmonary disease)     Emphysema of lung      No past surgical history on file.  No family history on file.  Social History[1]  Review of Systems   Constitutional:  Negative for fever.   HENT:  Negative for sore throat.    Respiratory:  Negative for shortness of breath.    Cardiovascular:  Negative for chest pain.   Gastrointestinal:  Negative for nausea.   Genitourinary:  Negative for dysuria.   Musculoskeletal:  Negative for back pain.   Skin:  Positive for wound. Negative for rash.   Neurological:  Negative for weakness.   Hematological:  Does not bruise/bleed easily.   Psychiatric/Behavioral:  Negative for behavioral problems.        Physical Exam     Initial Vitals [02/23/25 1010]   BP Pulse Resp Temp SpO2   (!) 147/77 93 16 98.4 °F (36.9 °C) 99 %      MAP       --         Physical Exam    Nursing note and vitals reviewed.  Constitutional: She appears well-developed.   HENT: Mouth/Throat:       Laceration noted to right upper inner lip with swelling    Cardiovascular:  Normal rate and regular rhythm.           Pulmonary/Chest: Breath sounds normal.   Musculoskeletal:         General: Normal range of motion.     Neurological: She is alert and oriented to person, place, and time.   Psychiatric: She has a normal mood and affect. Thought content normal.         Medical Screening Exam   See Full Note    ED Course   Procedures  Labs Reviewed - No data to display       Imaging Results    None          Medications - No data to display  Medical Decision Making  Pt presents with an injury to right upper lip after an elbow hit her last  night. Laceration noted to right upper inner lip with swelling.       Risk  Prescription drug management.  Risk Details: Pt is discharged home in stable condition with diagnosis of laceration to lip.                                      Clinical Impression:   Final diagnoses:  [S01.511A] Lip laceration, initial encounter (Primary)        ED Disposition Condition    Discharge Stable          ED Prescriptions       Medication Sig Dispense Start Date End Date Auth. Provider    cephALEXin (KEFLEX) 500 MG capsule Take 1 capsule (500 mg total) by mouth every 8 (eight) hours. for 10 days 30 capsule 2/23/2025 3/5/2025 Lola Wilson FNP          Follow-up Information    None              [1]   Social History  Tobacco Use    Smoking status: Every Day     Current packs/day: 0.50     Types: Cigarettes    Smokeless tobacco: Never   Substance Use Topics    Alcohol use: Yes    Drug use: Never        Lola Wilson FNP  02/23/25 1023

## 2025-02-23 NOTE — PROVIDER PROGRESS NOTES - EMERGENCY DEPT.
Encounter Date: 2/23/2025    ED Physician Progress Notes        Dr. Ireland assessed pt and agrees with the plan to treat with an antibiotic and ice to the lip.